# Patient Record
Sex: MALE | Race: WHITE
[De-identification: names, ages, dates, MRNs, and addresses within clinical notes are randomized per-mention and may not be internally consistent; named-entity substitution may affect disease eponyms.]

---

## 2019-06-17 ENCOUNTER — HOSPITAL ENCOUNTER (OUTPATIENT)
Dept: HOSPITAL 59 - HOP | Age: 64
Discharge: HOME | End: 2019-06-17
Attending: INTERNAL MEDICINE
Payer: MEDICARE

## 2019-06-17 DIAGNOSIS — Z86.010: ICD-10-CM

## 2019-06-17 DIAGNOSIS — Z12.11: Primary | ICD-10-CM

## 2019-06-17 PROCEDURE — 00812 ANES LWR INTST SCR COLSC: CPT

## 2019-06-19 NOTE — OPERATIVE NOTE
OPERATION: COLONOSCOPY to the cecum. 



INDICATION: History of adenomatous polyps. His last colonoscopy was 2013. He 
returns at this time for surveillance. He denies any current issues. 



ANESTHESIA: Intravenous sedation was administered by the department of 
anesthesiology and included Diprivan titrated to effect. 



PROCEDURE: Following informed consent from this alert individual including a 
discussion of the risks and benefits of the procedure and an opportunity for the
patient to ask questions, the patient was in the left lateral decubitus 
position. A digital rectal examination was performed. No abnormalities were 
noted. Following this, the Olympus [id number] video colonoscope was inserted into 
the rectum without resistance. The rectal mucosa had a normal appearance with 
normal folds and distensibility. The colonoscope was advanced up through the 
bowel to the level of the cecum without much difficulty. Throughout the bowel 
the mucosa appeared normal, the folds were normal, and the bowel was fairly well
distensible. The cecum was well defined by noting the appendiceal orifice and 
ileocecal valve. The terminal ileum was cannulated and found to be normal as 
well. From the base of the cecum, the colonoscope was then withdrawn. Overall, 
the colon preparation was good. No changes were noted throughout the bowel upon 
withdrawal. Retroflexion in the rectum was endoscopically normal. The instrument
was straightened and removed. The patient tolerated the procedure well and was 
returned to the recovery area in stable condition. 



IMPRESSION: Normal colonoscopy to the cecum and terminal ileum. 



RECOMMENDATIONS: Because of the patient's prior history of adenomatous polyps, I
did recommend a recheck colonoscopy in 5 years' time or sooner should problems 
arise. Followup will otherwise be with Deric Rey MD. 



As always, thank you for allowing me to participate in the care of your patient.
 CC: MD BASSAM Pak